# Patient Record
Sex: FEMALE | Race: WHITE | NOT HISPANIC OR LATINO | URBAN - METROPOLITAN AREA
[De-identification: names, ages, dates, MRNs, and addresses within clinical notes are randomized per-mention and may not be internally consistent; named-entity substitution may affect disease eponyms.]

---

## 2020-08-14 ENCOUNTER — OFFICE VISIT CONVERTED (OUTPATIENT)
Dept: ORTHOPEDIC SURGERY | Facility: CLINIC | Age: 35
End: 2020-08-14
Attending: ORTHOPAEDIC SURGERY

## 2020-09-18 ENCOUNTER — OFFICE VISIT CONVERTED (OUTPATIENT)
Dept: ORTHOPEDIC SURGERY | Facility: CLINIC | Age: 35
End: 2020-09-18
Attending: PHYSICIAN ASSISTANT

## 2020-10-07 ENCOUNTER — HOSPITAL ENCOUNTER (OUTPATIENT)
Dept: OTHER | Facility: HOSPITAL | Age: 35
Discharge: HOME | End: 2020-10-07
Attending: PHYSICIAN ASSISTANT

## 2020-10-12 ENCOUNTER — CONVERSION ENCOUNTER (OUTPATIENT)
Dept: ORTHOPEDIC SURGERY | Facility: CLINIC | Age: 35
End: 2020-10-12

## 2020-10-12 ENCOUNTER — OFFICE VISIT CONVERTED (OUTPATIENT)
Dept: ORTHOPEDIC SURGERY | Facility: CLINIC | Age: 35
End: 2020-10-12
Attending: PHYSICIAN ASSISTANT

## 2021-05-10 NOTE — H&P
"   History and Physical      Patient Name: Rachel Marino   Patient ID: [id number]   Sex: Female   YOB: 1985        Visit Date: August 14, 2020    Provider: William Barrientos MD   Location: [facility]   Location Address: [address] [place] [address]  [id number]   Location Phone: [phone]          Chief Complaint  · Right Wrist Pain      History Of Present Illness  Rachel Marino is a 35 year old female who presents today for a right wrist injury that occurred when she was lifting a box and felt a pop in her wrist on 06/18/2020. It has been 2.5 months and she is not better. She points to around the ulnar side of her wrist and over the dorsal ulnar side of her wrist as the source of the most pain. Also feels some pain going into her pinky finger.       Past Medical History  Reflux; Seasonal allergies         Past Surgical History  I have had no surgeries         Allergy List  NO KNOWN DRUG ALLERGIES; Mobic         Family Medical History  Family history of Arthritis         Social History  Alcohol Use (Never); lives with children; lives with spouse; .; Recreational Drug Use (Never); Tobacco (Never); Working         Review of Systems  · Constitutional  o Denies  o : fever, chills, weight loss  · Cardiovascular  o Denies  o : chest pain, shortness of breath  · Gastrointestinal  o Denies  o : liver disease, heartburn, nausea, blood in stools  · Genitourinary  o Denies  o : painful urination, blood in urine  · Integument  o Denies  o : rash, itching  · Neurologic  o Denies  o : headache, weakness, loss of consciousness  · Musculoskeletal  o Admits  o : painful, swollen joints  · Psychiatric  o Denies  o : drug/alcohol addiction, anxiety, depression      Vitals  Date Time BP Position Site L\R Cuff Size HR RR TEMP (F) WT  HT  BMI kg/m2 BSA m2 O2 Sat HC       08/14/2020 01:57 PM      68 - R   205lbs 16oz 5'  4\" 35.36 2.05 100 %          Physical " Examination  · Constitutional  o Appearance  o : well developed, well-nourished, no obvious deformities present  · Head and Face  o Head  o :   § Inspection  § : normocephalic  o Face  o :   § Inspection  § : no facial lesions  · Eyes  o Conjunctivae  o : conjunctivae normal  o Sclerae  o : sclerae white  · Ears, Nose, Mouth and Throat  o Ears  o :   § External Ears  § : appearance within normal limits  § Hearing  § : intact  o Nose  o :   § External Nose  § : appearance normal  · Neck  o Inspection/Palpation  o : normal appearance  o Range of Motion  o : full range of motion  · Respiratory  o Respiratory Effort  o : breathing unlabored  o Inspection of Chest  o : normal appearance  o Auscultation of Lungs  o : no audible wheezing or rales  · Cardiovascular  o Heart  o : regular rate  · Gastrointestinal  o Abdominal Examination  o : soft and non-tender  · Skin and Subcutaneous Tissue  o General Inspection  o : intact, no rashes  · Psychiatric  o General  o : Alert and oriented x3  o Judgement and Insight  o : judgment and insight intact  o Mood and Affect  o : mood normal, affect appropriate  · Right Wrist  o Inspection  o : Appearance of her wrist is normal compared to the other side. No skin discoloration, atrophy, or swelling. Mild tenderness with resisted supination of the wrist. Full range of motion. Negative Tinel's at the wrist. Negative Tinel's at [place] canal. Mild tenderness over ECU and possibly TFCC to palpation. Sensation intact. Neurovascularly intact.   · Injection Note/Aspiration Note  o Site  o : right wrist  o Procedure  o : After educating the patient, patient gave consent for procedure. After using Chloraprep, the injection was given. The patient tolerated the procedure well.  o Medication  o : 80 mg of DepoMedrol with 1cc of 1% Lidocaine  · Imaging  o Imaging  o : X-rays of her right wrist at [facility] on 07/06/2020 concluded no acute fracture or dislocation; sclerotic bone  island in the distal ulna; carpal bones well aligned.           Assessment  · Right wrist pain     719.43/M25.531  possible TFCC tear versus tendon strain      Plan  · Orders  o Depo-Medrol injection 80mg () - - 08/14/2020   Lot [id number] Exp 06 2021 [employer] Administered by RANDALL Barrientos MD  o Arthrocentesis of wrist (12297) - - 08/14/2020   Lot [id number] Exp 08 01 2021 [employer] Administered by RANDALL Barrientos MD  · Medications  o Medications have been Reconciled  o Transition of Care or Provider Policy  · Instructions  o Reviewed the patient's Past Medical, Social, and Family history as well as the ROS at today's visit, no changes.  o Call or return if worsening symptoms.  o This note was transcribed by Keren madrigal.  o We are going to try an injection first. Workman's Comp. denied an MRI request. I think it would be reasonable to try an injection first. Presumably, if she did have a TFCC tear, I probably would like try an injection anyway and wait 4-6 months for any other consideration, such as a wrist arthroscopy. For now, consider conservative management and trial injection. Follow up in 4-5 weeks.             Electronically Signed by: Keren Velarde-, Other -Author on August 15, 2020 02:24:21 PM  Electronically Co-signed by: William Barrientos MD -Reviewer on August 17, 2020 05:41:28 PM

## 2021-05-13 NOTE — PROGRESS NOTES
"   Progress Note      Patient Name: Rachel Marino   Patient ID: [id number]   Sex: Female   YOB: 1985        Visit Date: October 12, 2020    Provider: Maricruz Moyer PA-C   Location: [facility]   Location Address: [address] [place] [address]  [id number]   Location Phone: [phone]          Chief Complaint  · Right wrist pain      History Of Present Illness  Rachel Marino is a 35 year old /White female who presents today to [facility].      Patient is following up for right wrist injury that occurred when she was lifting a box and felt a pop in her wrist on 06/18/2020. Patient states pain is at the ulnar side of the wrist. Patient states pain with wrist extension. Patient states steroid injection provided relief for 1 week.            Past Medical History  Reflux; Seasonal allergies         Past Surgical History  I have had no surgeries         Allergy List  NO KNOWN DRUG ALLERGIES; Mobic         Family Medical History  Family history of Arthritis         Social History  Alcohol Use (Never); lives with children; lives with spouse; .; Recreational Drug Use (Never); Tobacco (Former); Working         Review of Systems  · Constitutional  o Denies  o : fever, chills, weight loss  · Cardiovascular  o Denies  o : chest pain, shortness of breath  · Gastrointestinal  o Denies  o : liver disease, heartburn, nausea, blood in stools  · Genitourinary  o Denies  o : painful urination, blood in urine  · Integument  o Denies  o : rash, itching  · Neurologic  o Denies  o : headache, weakness, loss of consciousness  · Musculoskeletal  o Denies  o : painful, swollen joints  · Psychiatric  o Denies  o : drug/alcohol addiction, anxiety, depression      Vitals  Date Time BP Position Site L\R Cuff Size HR RR TEMP (F) WT  HT  BMI kg/m2 BSA m2 O2 Sat FR L/min FiO2        10/12/2020 04:09 PM      102 - R   206lbs 8oz 5'  4\" 35.45 2.06 98 %            Physical " Examination  · Constitutional  o Appearance  o : well developed, well-nourished, no obvious deformities present  · Head and Face  o Head  o :   § Inspection  § : normocephalic  o Face  o :   § Inspection  § : no facial lesions  · Eyes  o Conjunctivae  o : conjunctivae normal  o Sclerae  o : sclerae white  · Ears, Nose, Mouth and Throat  o Ears  o :   § External Ears  § : appearance within normal limits  § Hearing  § : intact  o Nose  o :   § External Nose  § : appearance normal  · Neck  o Inspection/Palpation  o : normal appearance  o Range of Motion  o : full range of motion  · Respiratory  o Respiratory Effort  o : breathing unlabored  o Inspection of Chest  o : normal appearance  o Auscultation of Lungs  o : no audible wheezing or rales  · Cardiovascular  o Heart  o : regular rate  · Gastrointestinal  o Abdominal Examination  o : soft and non-tender  · Skin and Subcutaneous Tissue  o General Inspection  o : intact, no rashes  · Psychiatric  o General  o : Alert and oriented x3  o Judgement and Insight  o : judgment and insight intact  o Mood and Affect  o : mood normal, affect appropriate  · Right Hand-Street  o Inspection  o : No swelling, no erythema, no ecchymosis, no thenar atrophy, no mallet deformity, no boutonniere deformity, no heberden's nodes, no bri's nodes  o Palpation  o : No tenderness at distal radius, no tenderness at distal ulna, no tenderness anatomic snuffbox, no tenderness at scaphoid tubercle, tenderness at TFCC, tenderness of ulnar collateral ligament, no tenderness at metacarpal, no tenderness at PIP/DIP joint  o ROM  o : Full wrist extension, full wrist flexion, full ulnar deviation, full raidal deviation, full MCP/PIP/DIP flexion, full MCP/PIP/DIP Extension, full opposition  o Strength  o : full wrist extension, full wrist flexion, full , full thumb oppostion, full PIP flexors, full DIP flexors, full PIP extensors, full finger adduction, full finger abduction  o Special  Tests  o : Negative phalen's test, negative tinel's test, negative finkelstein's test, negative yanez's test, negative loaded circumduction test, negative compression test, negative shift test  o Neurovascular  o : Full sensation, radial pulse 2+, ulnar pulse 2+          Assessment  · Right wrist pain     719.43/M25.531  · Wrist injury     959.3/S69.90XA      Plan  · Medications  o Medications have been Reconciled  o Transition of Care or Provider Policy  · Instructions  o Reviewed the patient's Past Medical, Social, and Family history as well as the ROS at today's visit, no changes.  o Call or return if worsening symptoms.  o Electronically Identified Patient Education Materials Provided Electronically     Referred to Dr. Woods             Electronically Signed by: Maricruz Moyer PA-C -Author on October 13, 2020 08:58:44 AM

## 2021-05-13 NOTE — PROGRESS NOTES
"   Progress Note      Patient Name: Rachel Marino   Patient ID: [id number]   Sex: Female   YOB: 1985        Visit Date: September 18, 2020    Provider: Maricruz Moyer PA-C   Location: [facility]   Location Address: [address] [place] [address]  [id number]   Location Phone: [phone]          Chief Complaint  · Follow up right wrist pain      History Of Present Illness  Rachel Marino is a 35 year old /White female who presents today to [facility].      Patient is following up for right wrist injury that occurred when she was lifting a box and felt a pop in her wrist on 06/18/2020. Patient states pain is at the ulnar side of the wrist. Patient states pain with wrist extension. Patient states steroid injection provided relief for 1 week.       Past Medical History  Reflux; Seasonal allergies         Past Surgical History  I have had no surgeries         Allergy List  NO KNOWN DRUG ALLERGIES; Mobic         Family Medical History  Family history of Arthritis         Social History  Alcohol Use (Never); lives with children; lives with spouse; .; Recreational Drug Use (Never); Tobacco (Former); Working         Review of Systems  · Constitutional  o Denies  o : fever, chills, weight loss  · Cardiovascular  o Denies  o : chest pain, shortness of breath  · Gastrointestinal  o Denies  o : liver disease, heartburn, nausea, blood in stools  · Genitourinary  o Denies  o : painful urination, blood in urine  · Integument  o Denies  o : rash, itching  · Neurologic  o Denies  o : headache, weakness, loss of consciousness  · Musculoskeletal  o Denies  o : painful, swollen joints  · Psychiatric  o Denies  o : drug/alcohol addiction, anxiety, depression      Vitals  Date Time BP Position Site L\R Cuff Size HR RR TEMP (F) WT  HT  BMI kg/m2 BSA m2 O2 Sat HC       09/18/2020 01:52 PM      59 - R   204lbs 16oz 5'  4\" 35.19 2.05 99 %          Physical " Examination  · Constitutional  o Appearance  o : well developed, well-nourished, no obvious deformities present  · Head and Face  o Head  o :   § Inspection  § : normocephalic  o Face  o :   § Inspection  § : no facial lesions  · Eyes  o Conjunctivae  o : conjunctivae normal  o Sclerae  o : sclerae white  · Ears, Nose, Mouth and Throat  o Ears  o :   § External Ears  § : appearance within normal limits  § Hearing  § : intact  o Nose  o :   § External Nose  § : appearance normal  · Neck  o Inspection/Palpation  o : normal appearance  o Range of Motion  o : full range of motion  · Respiratory  o Respiratory Effort  o : breathing unlabored  o Inspection of Chest  o : normal appearance  o Auscultation of Lungs  o : no audible wheezing or rales  · Cardiovascular  o Heart  o : regular rate  · Gastrointestinal  o Abdominal Examination  o : soft and non-tender  · Skin and Subcutaneous Tissue  o General Inspection  o : intact, no rashes  · Psychiatric  o General  o : Alert and oriented x3  o Judgement and Insight  o : judgment and insight intact  o Mood and Affect  o : mood normal, affect appropriate  · Right Hand-Street  o Inspection  o : Swellling present, no erythema, no ecchymosis, no thenar atrophy, no mallet deformity, no boutonniere deformity, no heberden's nodes, no bri's nodes  o Palpation  o : No tenderness at distal radius, no tenderness at distal ulna, no tenderness anatomic snuffbox, no tenderness at scaphoid tubercle, tenderness at TFCC, tenderness of ulnar collateral ligament, no tenderness at metacarpal, no tenderness at PIP/DIP joint  o ROM  o : Full wrist extension, full wrist flexion, full ulnar deviation, full raidal deviation, full MCP/PIP/DIP flexion, full MCP/PIP/DIP Extension, full opposition  o Strength  o : painful wrist extension, full wrist flexion, full , full thumb oppostion, full PIP flexors, full DIP flexors, full PIP extensors, full finger adduction, full finger abduction  o Special  Tests  o : Negative phalen's test, negative tinel's test, negative finkelstein's test, negative yanez's test, negative loaded circumduction test, positive compression test, negative shift test  o Neurovascular  o : Full sensation, radial pulse 2+, ulnar pulse 2+          Assessment  · Pain: Wrist     719.43/M25.539  · Wrist injury     959.3/S69.90XA      Plan  · Medications  o Medications have been Reconciled  o Transition of Care or Provider Policy  · Instructions  o Reviewed the patient's Past Medical, Social, and Family history as well as the ROS at today's visit, no changes.  o Call or return if worsening symptoms.  o Electronically Identified Patient Education Materials Provided Electronically     MRI Arthrogram               Electronically Signed by: Maricruz Moyer PA-C -Author on September 18, 2020 02:20:32 PM

## 2021-05-14 VITALS — HEART RATE: 59 BPM | OXYGEN SATURATION: 99 % | BODY MASS INDEX: 35 KG/M2 | HEIGHT: 64 IN | WEIGHT: 205 LBS

## 2021-05-14 VITALS — OXYGEN SATURATION: 98 % | WEIGHT: 206.5 LBS | HEIGHT: 64 IN | HEART RATE: 102 BPM | BODY MASS INDEX: 35.26 KG/M2

## 2021-05-15 VITALS — OXYGEN SATURATION: 100 % | HEIGHT: 64 IN | WEIGHT: 206 LBS | HEART RATE: 68 BPM | BODY MASS INDEX: 35.17 KG/M2
